# Patient Record
Sex: FEMALE | Race: BLACK OR AFRICAN AMERICAN | NOT HISPANIC OR LATINO | URBAN - METROPOLITAN AREA
[De-identification: names, ages, dates, MRNs, and addresses within clinical notes are randomized per-mention and may not be internally consistent; named-entity substitution may affect disease eponyms.]

---

## 2023-10-16 PROBLEM — E66.3 OVERWEIGHT WITH BODY MASS INDEX (BMI) OF 29 TO 29.9 IN ADULT: Status: ACTIVE | Noted: 2023-10-16

## 2023-10-16 PROBLEM — Z20.2 STD EXPOSURE: Status: ACTIVE | Noted: 2023-10-16

## 2023-10-16 PROBLEM — F41.9 ANXIETY: Status: ACTIVE | Noted: 2023-10-16

## 2023-10-16 PROBLEM — F32.A DEPRESSION: Status: ACTIVE | Noted: 2023-10-16

## 2023-10-16 RX ORDER — CLOBETASOL PROPIONATE 0.5 MG/G
OINTMENT TOPICAL 2 TIMES DAILY
COMMUNITY

## 2023-10-18 ENCOUNTER — OFFICE VISIT (OUTPATIENT)
Dept: PRIMARY CARE | Facility: CLINIC | Age: 23
End: 2023-10-18
Payer: COMMERCIAL

## 2023-10-18 VITALS
OXYGEN SATURATION: 95 % | TEMPERATURE: 97.7 F | BODY MASS INDEX: 27.93 KG/M2 | WEIGHT: 173.8 LBS | HEART RATE: 90 BPM | SYSTOLIC BLOOD PRESSURE: 110 MMHG | DIASTOLIC BLOOD PRESSURE: 76 MMHG | HEIGHT: 66 IN

## 2023-10-18 DIAGNOSIS — L30.9 ECZEMA, UNSPECIFIED TYPE: ICD-10-CM

## 2023-10-18 DIAGNOSIS — Z01.419 WELL WOMAN EXAM: Primary | ICD-10-CM

## 2023-10-18 PROCEDURE — 87661 TRICHOMONAS VAGINALIS AMPLIF: CPT

## 2023-10-18 PROCEDURE — 99395 PREV VISIT EST AGE 18-39: CPT | Performed by: FAMILY MEDICINE

## 2023-10-18 PROCEDURE — 87800 DETECT AGNT MULT DNA DIREC: CPT

## 2023-10-18 PROCEDURE — 88175 CYTOPATH C/V AUTO FLUID REDO: CPT

## 2023-10-18 PROCEDURE — 88141 CYTOPATH C/V INTERPRET: CPT | Performed by: PATHOLOGY

## 2023-10-18 PROCEDURE — 1036F TOBACCO NON-USER: CPT | Performed by: FAMILY MEDICINE

## 2023-10-18 PROCEDURE — 87624 HPV HI-RISK TYP POOLED RSLT: CPT

## 2023-10-18 RX ORDER — HYDROCORTISONE 1 %
CREAM (GRAM) TOPICAL 2 TIMES DAILY
COMMUNITY

## 2023-10-18 RX ORDER — FLUTICASONE PROPIONATE 0.5 MG/G
CREAM TOPICAL 2 TIMES DAILY
Qty: 30 G | Refills: 0 | Status: SHIPPED | OUTPATIENT
Start: 2023-10-18

## 2023-10-18 NOTE — PROGRESS NOTES
"Subjective   Ariana Leija is a 22 y.o. female who is here for a routine exam. Periods are regular every 28-30 days, lasting 1 days. Dysmenorrhea:none. Cyclic symptoms include none. No intermenstrual bleeding, spotting, or discharge.  Lmp first week of month. Nl for her. Last sex last month. Always uses condoms. One partner.  Current contraception: none  History of abnormal Pap smear: no  Family history of uterine or ovarian cancer: no  Regular self breast exam: yes  History of abnormal mammogram: no  Family history of breast cancer: no  History of abnormal lipids: no  Menstrual History:  One eab     Menarche age: 13  Lmp earlier this month first week of oct         Review of Systems   All other systems reviewed and are negative.      Objective   /76   Pulse 90   Temp 36.5 °C (97.7 °F)   Ht 1.664 m (5' 5.5\")   Wt 78.8 kg (173 lb 12.8 oz)   SpO2 95%   BMI 28.48 kg/m²     General:   alert and oriented, in no acute distress, appears stated age, and cooperative   Heart: regular rate and rhythm, S1, S2 normal, no murmur, click, rub or gallop   Lungs: clear to auscultation bilaterally   Abdomen: soft, non-tender, without masses or organomegaly   Vulva: normal   Vagina: thin grey discharge   Cervix: no bleeding following Pap   Uterus: normal size   Adnexa: normal adnexa     Assessment/Plan   Problem List Items Addressed This Visit    None  Visit Diagnoses       Well woman exam    -  Primary    Relevant Orders    THINPREP PAP TEST    Eczema, unspecified type        Relevant Medications    fluticasone (Cutivate) 0.05 % cream               "

## 2023-10-19 LAB
C TRACH RRNA SPEC QL NAA+PROBE: NEGATIVE
N GONORRHOEA DNA SPEC QL PROBE+SIG AMP: NEGATIVE
T VAGINALIS RRNA SPEC QL NAA+PROBE: NEGATIVE

## 2023-11-01 DIAGNOSIS — B37.9 YEAST INFECTION: Primary | ICD-10-CM

## 2023-11-01 LAB
CYTOLOGY CMNT CVX/VAG CYTO-IMP: NORMAL
HPV HR GENOTYPES PNL CVX NAA+PROBE: POSITIVE
HPV HR GENOTYPES PNL CVX NAA+PROBE: POSITIVE
HPV16 DNA SPEC QL NAA+PROBE: NEGATIVE
HPV18 DNA SPEC QL NAA+PROBE: NEGATIVE
LAB AP CONTRACEPTIVE HISTORY: NORMAL
LAB AP HPV GENOTYPE QUESTION: YES
LAB AP HPV HR: NORMAL
LAB AP PAP ADDITIONAL TESTS: NORMAL
LABORATORY COMMENT REPORT: NORMAL
LMP START DATE: NORMAL
PATH REPORT.TOTAL CANCER: NORMAL

## 2023-11-01 RX ORDER — FLUCONAZOLE 150 MG/1
150 TABLET ORAL ONCE
Qty: 1 TABLET | Refills: 0 | Status: SHIPPED | OUTPATIENT
Start: 2023-11-01 | End: 2023-11-01

## 2024-06-03 ENCOUNTER — TELEPHONE (OUTPATIENT)
Dept: PRIMARY CARE | Facility: CLINIC | Age: 24
End: 2024-06-03
Payer: COMMERCIAL

## 2024-06-03 NOTE — TELEPHONE ENCOUNTER
Pt mom wants to know if you can call her at: [phone].    Thinks pt might need ENT referral. She is away at school in [place]. Will be home 6/18/24-6/24/24. Mom wants to know if you can call her to help guide if you think pt needs to see ENT or come for office visit. Says she could do a virtual visit this week if need be. 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

## 2024-06-04 DIAGNOSIS — R49.1 LOST VOICE: Primary | ICD-10-CM

## 2024-06-10 ENCOUNTER — APPOINTMENT (OUTPATIENT)
Dept: PRIMARY CARE | Facility: CLINIC | Age: 24
End: 2024-06-10
Payer: COMMERCIAL

## 2024-06-24 ENCOUNTER — OFFICE VISIT (OUTPATIENT)
Dept: OTOLARYNGOLOGY | Facility: HOSPITAL | Age: 24
End: 2024-06-24
Payer: COMMERCIAL

## 2024-06-24 ENCOUNTER — EVALUATION (OUTPATIENT)
Dept: SPEECH THERAPY | Facility: HOSPITAL | Age: 24
End: 2024-06-24
Payer: COMMERCIAL

## 2024-06-24 VITALS — TEMPERATURE: 96.4 F | WEIGHT: 178 LBS | BODY MASS INDEX: 28.61 KG/M2 | HEIGHT: 66 IN

## 2024-06-24 DIAGNOSIS — R04.1 HEMORRHAGE OF VOCAL CORD: ICD-10-CM

## 2024-06-24 DIAGNOSIS — R49.0 MUSCLE TENSION DYSPHONIA: ICD-10-CM

## 2024-06-24 DIAGNOSIS — R49.0 VOICE HOARSENESS: ICD-10-CM

## 2024-06-24 DIAGNOSIS — R49.8 VOICE FATIGUE: Primary | ICD-10-CM

## 2024-06-24 DIAGNOSIS — R49.1 LOST VOICE: ICD-10-CM

## 2024-06-24 DIAGNOSIS — R49.0 DYSPHONIA: Primary | ICD-10-CM

## 2024-06-24 DIAGNOSIS — R47.89 BREATHY VOICE QUALITY: ICD-10-CM

## 2024-06-24 DIAGNOSIS — J38.1 VOCAL CORD POLYP: ICD-10-CM

## 2024-06-24 PROCEDURE — 31579 LARYNGOSCOPY TELESCOPIC: CPT | Performed by: OTOLARYNGOLOGY

## 2024-06-24 PROCEDURE — 1036F TOBACCO NON-USER: CPT | Performed by: OTOLARYNGOLOGY

## 2024-06-24 PROCEDURE — 99215 OFFICE O/P EST HI 40 MIN: CPT | Performed by: OTOLARYNGOLOGY

## 2024-06-24 PROCEDURE — 99204 OFFICE O/P NEW MOD 45 MIN: CPT | Performed by: OTOLARYNGOLOGY

## 2024-06-24 PROCEDURE — 92524 BEHAVRAL QUALIT ANALYS VOICE: CPT | Mod: GN | Performed by: SPEECH-LANGUAGE PATHOLOGIST

## 2024-06-24 RX ORDER — PREDNISONE 10 MG/1
TABLET ORAL
Qty: 42 TABLET | Refills: 0 | Status: SHIPPED | OUTPATIENT
Start: 2024-06-24 | End: 2024-07-04

## 2024-06-24 ASSESSMENT — PATIENT HEALTH QUESTIONNAIRE - PHQ9
2. FEELING DOWN, DEPRESSED OR HOPELESS: NOT AT ALL
SUM OF ALL RESPONSES TO PHQ9 QUESTIONS 1 & 2: 0
1. LITTLE INTEREST OR PLEASURE IN DOING THINGS: NOT AT ALL

## 2024-06-24 ASSESSMENT — PAIN - FUNCTIONAL ASSESSMENT: PAIN_FUNCTIONAL_ASSESSMENT: 0-10

## 2024-06-24 ASSESSMENT — PAIN SCALES - GENERAL: PAINLEVEL_OUTOF10: 0 - NO PAIN

## 2024-06-24 NOTE — PROGRESS NOTES
ASSESSMENT AND PLAN:   Ariana Leija is a 23 y.o. female presenting for an initial visit with history of significant vocal demand. She is currently in school in [place] and scheduled for an appearance on [employer] (TV show) next week. This has significant implications for her career. Two months ago she had an episode where she had been at her usual high vocal demand but had additional studio time for several days which resulted in vocal fatigue, weakness, and mild discomfort that she pushed through. Since that time she has had difficulty with her vocal range and increased effort.     Vocal quality today is mildly breathy with speaking and singing voice. Videostroboscopy demonstrates mild right cord thickening, right > left edema with evidence of recent vocal fold hemorrhage on the right. There is a small venous lake in the midcord, left.     We had a long discussion regarding her career goals, the importance of the upcoming audition, and the importance of a modified vocal rest over the next week prior to her show if she wants to proceed with her audition taping.  We discussed the use of a steroid pulse prior to this to help her recover as well as seeing my colleague in [place] whom I will reach out to today. She will work with our speech pathologist Rosendo today. We discussed light vocal use over the net week to keep the vocal cords flexible in preparation for her audition. She will be leaving tomorrow for [place] and I will defer management/decision making/discussions to my colleague in [place]. May follow up with me when back in [facility].      Will send email.         Reason For Consult           HISTORY OF PRESENT ILLNESS:  Ariana Leija is a 23 y.o. female presenting for an initial visit with me for voice concerns.      The patient reports that she is in college in music in [place]. 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 About 2 months ago she had an  opportunity to do some recording in the studio and overworked her voice. She has been trying to do vocal rest every week sometimes for 3 to 4 days but still has vocal weakness/fatigue and unable to obtain full pitch range. This has been ongoing over the last 2 months. 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 Since that week, her voice has not recovered. She does have a warm-up routine and we discussed this with our SLP partner.     She returns to [place] tomorrow for an important, potentially life-changing performance on [employer] on July 4 and 5. She will be singing with a microphone and we discussed importance of using microphone acoustics to help her in this audition.     PMH is negative. PSH is negative.   Has smoked marijuana in the past but not presently. OTW Nonsmoker. Nondrinker.          Past Medical History  She has no past medical history on file. Surgical History  She has no past surgical history on file.   Social History  She reports that she has never smoked. She has never used smokeless tobacco. She reports current alcohol use. She reports that she does not use drugs. Allergies  Nut - unspecified, Peanut, Shellfish derived, and Tree nuts     Family History  No family history on file.     Review of Systems  All 10 systems were reviewed and negative except for above.      Physical Exam    ENT Physical Exam   ENT Physical Exam  Constitutional  Appearance: patient appears well-developed and well-nourished,  Head and Face  Appearance: head appears normal and face appears normal;  Ear  Auricles: right auricle normal; left auricle normal;  Nose  External Nose: nares patent bilaterally;  Oral Cavity/Oropharynx  Lips: normal;  Neck  Neck: neck normal; neck palpation normal;  Respiratory  Inspection: no retractions visible;  Cardiovascular  Inspection: no peripheral edema present;  Neurovestibular  Mental  "Status: alert and oriented;  Psychiatric: mood normal;  Cranial Nerves: cranial nerves intact;    Last Recorded Vitals  Temperature 35.8 °C (96.4 °F), height 1.664 m (5' 5.5\"), weight 80.7 kg (178 lb).    Relevant Results       Patient Reported Outcome Measures         Radiology, Laboratory and Pathology  No results found.      ----------------------------------------------------------------------  Procedures     Flexible Laryngoscopy w/ Videostroboscopy    VOICE AND SPEECH CHARACTERISTICS:  Normal spoken speech, (+) mild dysphonia, no roughness, (+) mild breathiness, no asthenia, (+) mild strain.    Intelligibility: normal.   Resonance: balanced.   Vocal Loudness: normal.   Breath Support: normal.    PROCEDURE:    Indications: voice change  PROCEDURE NOTE: FLEXIBLE LARYNGOSCOPY WITH STROBOSCOPY  I recommended a flexible laryngoscopy with stroboscopy based on PE findings, and/or concern for mucosal wave details based upon history and/or for issues associated with hyperreflexic gag on mirror exam concerning for pathology. Risks, benefits, and alternatives were explained. The patient wishes to proceed and gives verbal consent.   Patient is seated in the exam chair. After adequate topical anesthesia, I advance the flexible endoscope. The examination included evaluation of the wilcox, vallecula, base of tongue, pyriforms, post-cricoid area, larynx and immediate subglottis.    Reflux Finding Score  Subglottic edema: Absent  Thick Mucus: Absent  Granuloma: Absent  Ventricular Obliteration: Partial (2)  Erythema/Hyperemia: complete  IA Thickening: none  TVC Edema: mild  Diffuse Laryngitis: Absent    Gross Arytenoid Movement: symmetric.  Arytenoid Height: normal.   Supraglottic Tension: lateral.  Symmetry: asymmetry.   Amplitude: reduced: bilateral.  Phase Closure: in-phase.  Mucosal Wave Lateral Excursion/Secondary Wave: Right Vocal Cord: mild restriction - Wave moved more than ¼, less than ½ the width of the vocal " fold.  Periodicity: normal.  Closure: hourglass.    Time Spent  Prep time on day of patient encounter: 5-10 minutes  Time spent directly with patient, family or caregiver: 25 minutes  Additional Time Spent on Patient Care Activities/discuss care plan with SLP: 5 minutes  Documentation Time: 10 minutes  Other Time Spent: 0 minutes  Total: 50 minutes     Scribe Attestation  By signing my name below, I, Brionna Harden , Scribe   attest that this documentation has been prepared under the direction and in the presence of Mitchell Sneed MD.

## 2024-06-25 ENCOUNTER — OFFICE VISIT (OUTPATIENT)
Dept: OTOLARYNGOLOGY | Facility: CLINIC | Age: 24
End: 2024-06-25
Payer: COMMERCIAL

## 2024-06-25 VITALS — TEMPERATURE: 96.9 F | BODY MASS INDEX: 29.61 KG/M2 | WEIGHT: 177.7 LBS | HEIGHT: 65 IN

## 2024-06-25 DIAGNOSIS — R49.8 VOICE FATIGUE: ICD-10-CM

## 2024-06-25 DIAGNOSIS — R04.1 HEMORRHAGE OF VOCAL CORD: ICD-10-CM

## 2024-06-25 DIAGNOSIS — R49.0 VOICE HOARSENESS: Primary | ICD-10-CM

## 2024-06-25 DIAGNOSIS — J38.4 VOCAL CORD EDEMA: ICD-10-CM

## 2024-06-25 PROCEDURE — 99214 OFFICE O/P EST MOD 30 MIN: CPT

## 2024-06-25 PROCEDURE — 1036F TOBACCO NON-USER: CPT

## 2024-06-25 ASSESSMENT — PATIENT HEALTH QUESTIONNAIRE - PHQ9
1. LITTLE INTEREST OR PLEASURE IN DOING THINGS: NOT AT ALL
2. FEELING DOWN, DEPRESSED OR HOPELESS: NOT AT ALL
SUM OF ALL RESPONSES TO PHQ9 QUESTIONS 1 AND 2: 0

## 2024-06-25 NOTE — PROGRESS NOTES
Reason For Consult  Chief Complaint   Patient presents with    Follow-up     2nd opinion voice fatigue        HISTORY OF PRESENT ILLNESS:  Ariana Leija, who is a 23 y.o. female presenting for a follow-up visit for hoarseness of voice and to further discuss scope exam from yesterday.  The patient was seen by Dr. Sneed yesterday on 6/24/24 and had a laryngoscopy with stroboscopy performed. The patient states she has been experiencing vocal hoarseness and vocal fatigue for the past two months. 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 (which is in the beginning of July). The patient states her voice has been hoarse and fatigued daily over these past 2 months. The patient was prescribed prednisone by Dr. Sneed and the patient states she will start the prednisone today. The patient's main concern is that she does not want to push her voice and further damage the voice for her upcoming show.     Past Medical History  She has no past medical history on file.  Surgical History  She has no past surgical history on file.     Social History  She reports that she has never smoked. She has never used smokeless tobacco. She reports current alcohol use. She reports that she does not use drugs.    Allergies  Nut - unspecified, Peanut, Shellfish derived, and Tree nuts    Review of Systems  All 10 systems were reviewed and negative except for above.      Physical Exam  CONSTITUTIONAL: Well developed, well nourished.    VOICE: mild hoarseness and breathiness  RESPIRATION: Breathing comfortably, no stridor.    NEURO: Alert and oriented x3, cranial nerves II-XII intact and symmetric bilaterally.    EARS: Normal external ears, external auditory canals, normal hearing to conversational voice.    NOSE: External nose midline, anterior rhinoscopy is normal with limited visualization to the anterior aspect  "of the interior turbinates. No lesions noted.     ORAL CAVITY/OROPHARYNX/LIPS: Normal mucous membranes, normal floor of mouth/tongue/OP, no masses or lesions are noted.    SKIN: Neck skin is normal, no scar or injury.    PSYCH: Alert and oriented with appropriate mood and affect.        Last Recorded Vitals  Temperature 36.1 °C (96.9 °F), height 1.651 m (5' 5\"), weight 80.6 kg (177 lb 11.2 oz).    Relevant Results  Reviewed Laryngoscopy with stroboscopy exam with Doctor Sneed on 6/24/24.  Dr. Sneed's previous note reads \"Videostroboscopy demonstrates mild right cord thickening, right > left edema with evidence of recent vocal fold hemorrhage on the right. There is a small venous lake in the midcord, left.        ASSESSMENT AND PLAN:   This is an initial visit for hoarseness of voice that is a follow-up from yesterday after meeting with Dr. Sneed. Scope exam reviewed again with patient. The patient has is concerned of further damaging voice and discussed voice rest leading up to the taping of her show. Discussed with the patient that singing at he full capacity could further damage voice. Reiterated use of amplification and acoustics to aid her voice if she decides to proceed. Also discussed singing in a more limited capacity or not at all if she were to proceed with the taping of the TV show to prevent further vocal cord/voice damage.     We discussed the treatment options to include, medical and surgical options.  We have decided to proceed as follows:  Start prednisone prescribed by Dr. Sneed  Patient given contact information of a Laryngologist in [place] by Dr. Sneed   while she is living there. Recommend scheduling an appointment to be seen by Dr. Sneed's Colleague in [place] when she leaves tomorrow for further management.    3.   Can follow up with Dr. Sneed if needed when patient is in [place].      "

## 2024-06-26 PROBLEM — R04.1 HEMORRHAGE OF VOCAL CORD: Status: ACTIVE | Noted: 2024-06-26

## 2024-06-26 PROBLEM — R49.0 DYSPHONIA: Status: ACTIVE | Noted: 2024-06-26

## 2024-06-26 NOTE — PROGRESS NOTES
Speech-Language Pathology    Voice Evaluation    Patient Name: Ariana Leija  MRN: [id number]  Today's Date: 6/26/2024     Time Calculation  Start Time: 1330  Stop Time: 1430  Time Calculation (min): 60 min      Current Problem:  Patient Active Problem List   Diagnosis    Anxiety    Depression    Overweight with body mass index (BMI) of 29 to 29.9 in adult    STD exposure    Dysphonia    Hemorrhage of vocal cord       Voice Assessment:   Ariana Leija is a 23 y.o. female presenting for an initial visit with history of significant vocal demand. She is currently in school in [place] and scheduled for an appearance on [employer] (TV show) next week. This has significant implications for her career. Two months ago she had an episode where she had been at her usual high vocal demand but had additional studio time for several days which resulted in vocal fatigue, weakness, and mild discomfort that she pushed through. Since that time she has had difficulty with her vocal range and increased effort.      Vocal quality today is mildly breathy with speaking and singing voice. Intermittent rasp appreciated. Videostroboscopy demonstrates mild right cord thickening, right > left edema with evidence of recent vocal fold hemorrhage on the right. There is a small venous lake in the midcord, left.      We had a long discussion regarding her career goals, the importance of the upcoming audition, and the importance of a modified vocal rest over the next week prior to her show if she wants to proceed with her audition taping.  We discussed the use of a steroid pulse prior to this to help her recover as well as seeing my colleague in [place] whom I will reach out to today.     SLP consult completed with emphasis on vocal wellness and vocal rest. We reviewed modified voice rest with very limited access to speaking voice use. Patient to apply confidential voicing to all conversational exchange and to limit this to <10  minutes/hour. Recommend she continue SOVT +cup bubbles, trill work and hum at easy pitches and light sirens to promote continued vocal flexibility and recovery. Emphasis placed on silent practice to help her continue to prepare for possible audition next week. We discussed light vocal use over the net week to keep the vocal cords flexible in preparation for her audition.     We will help coordinate care with local laryngologist/voice therapy team for repeat exam to determine readiness for vocal audition.      Voice Plan of Care:  Frequency: x1/mo  Duration: x3 months  Number of Visits: 3  Recommendations for therapeutic interventions: Speech/Voice exercises, Vocal hygiene program, Irritable larynx retraining  Prognosis: Good  Factors affecting prognosis: Motivation  Discussed Plan of Care: Patient, Physician, Discussed risks/benefits with patient/caregiver, Patient/caregiver agreeable with Plan of Care        Subjective   Current Problem:   HISTORY OF PRESENT ILLNESS:  Ariana Leija is a 23 y.o. female presenting for an initial visit with me for voice concerns.       The patient reports that she is in college in music in [place]. 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 She has been trying to do vocal rest every week sometimes for 3 to 4 days but still has vocal weakness/fatigue and unable to obtain full pitch range. This has been ongoing over the last 2 months. 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 Since that week, her voice has not recovered.      She returns to [place] tomorrow for an important, potentially life-changing performance on [employer] on July 4 and 5. She will be singing with a microphone and we discussed importance of using microphone acoustics  to help her in this audition.      PMH is negative. PSH is negative.   Has smoked marijuana in the past but not presently. OTW Nonsmoker. Nondrinker.           Past Medical History  She has no past medical history on file. Surgical History  She has no past surgical history on file.   Social History  She reports that she has never smoked. She has never used smokeless tobacco. She reports current alcohol use. She reports that she does not use drugs. Allergies  Nut - unspecified, Peanut, Shellfish derived, and Tree nuts       General Visit Information:  Patient Class: Outpatient  Living Environment: Home  Arrival: Independent  Ordering Physician: Dr. Sneed  Reason for Referral: Dysphonia    Objective     Pain Assessment:  Pain Assessment: 0-10  0-10 (Numeric) Pain Score: 0 - No pain      Voice Use Inventory:  Voice misuse/abuse: Throat clear (excessive voice use/overuse)  Exposure to Noise: No  Exposure to respiratory irritants: No  Consistent use of singing voice: Yes  Occupation relies on speaking voice: Yes    Patient Self Assessment:  Daily water intake: Yes  Daily caffeine intake: Yes  Alcohol intake: Yes  Smoking history: No (Marijuanna - rare)  Reflux history: No    Voice Objective:  Motor Speech Production: WFL  Pitch: WFL  Voice Quality: Harsh  Fluency: WFL  Prosody: WFL  Resonance: WFL  Loudness: WFL    Voice Analysis:   Flexible Laryngoscopy w/ Videostroboscopy     VOICE AND SPEECH CHARACTERISTICS:  Normal spoken speech, (+) mild dysphonia, no roughness, (+) mild breathiness, no asthenia, (+) mild strain.     Intelligibility: normal.   Resonance: balanced.   Vocal Loudness: normal.   Breath Support: normal.     PROCEDURE:    Indications: voice change  PROCEDURE NOTE: FLEXIBLE LARYNGOSCOPY WITH STROBOSCOPY  I recommended a flexible laryngoscopy with stroboscopy based on PE findings, and/or concern for mucosal wave details based upon history and/or for issues associated with hyperreflexic gag on mirror exam  concerning for pathology. Risks, benefits, and alternatives were explained. The patient wishes to proceed and gives verbal consent.   Patient is seated in the exam chair. After adequate topical anesthesia, I advance the flexible endoscope. The examination included evaluation of the wilcox, vallecula, base of tongue, pyriforms, post-cricoid area, larynx and immediate subglottis.     Reflux Finding Score  Subglottic edema: Absent  Thick Mucus: Absent  Granuloma: Absent  Ventricular Obliteration: Partial (2)  Erythema/Hyperemia: complete  IA Thickening: none  TVC Edema: mild  Diffuse Laryngitis: Absent     Gross Arytenoid Movement: symmetric.  Arytenoid Height: normal.   Supraglottic Tension: lateral.  Symmetry: asymmetry.   Amplitude: reduced: bilateral.  Phase Closure: in-phase.  Mucosal Wave Lateral Excursion/Secondary Wave: Right Vocal Cord: mild restriction - Wave moved more than ¼, less than ½ the width of the vocal fold.  Periodicity: normal.  Closure: hourglass.    Voice Treatment:  Individual(s) Educated: Patient  Verbal Education: Risks/benefits of therapy, Results of testing, Exercises, Communication strategies, Technology assistance, Listening strategies (voice rest, confidential voice, RVT/SOVT/Trill)  Written Education Provided: Exercises  Response to Education: Verbalized understanding, Demonstrated understanding, Needs review  Patient/Caregiver Verbalized Understanding and Agreement: Yes        Active       Voice       SLP Goal 1       Start:  06/26/24    Expected End:  10/26/24       Patient will increase vocal wellness and decrease phonotrauma in adherence with clinician prescribed vocal hygiene and wellness program per patient report.          SLP Goal 2       Start:  06/26/24    Expected End:  10/26/24       Patient will accesses the upper fourth of their functional phonation range at 80 dB or below without delayed onset of phonation or obvious auditory or physical signs of laryngeal or eva-laryngeal  hyperfunction.              Time In: 1330  Time Out: 1430